# Patient Record
Sex: FEMALE | Race: WHITE | NOT HISPANIC OR LATINO | Employment: FULL TIME | URBAN - METROPOLITAN AREA
[De-identification: names, ages, dates, MRNs, and addresses within clinical notes are randomized per-mention and may not be internally consistent; named-entity substitution may affect disease eponyms.]

---

## 2023-09-01 ENCOUNTER — OFFICE VISIT (OUTPATIENT)
Dept: URGENT CARE | Facility: CLINIC | Age: 38
End: 2023-09-01
Payer: COMMERCIAL

## 2023-09-01 VITALS
WEIGHT: 182.88 LBS | BODY MASS INDEX: 31.22 KG/M2 | HEART RATE: 89 BPM | RESPIRATION RATE: 18 BRPM | SYSTOLIC BLOOD PRESSURE: 107 MMHG | HEIGHT: 64 IN | OXYGEN SATURATION: 97 % | DIASTOLIC BLOOD PRESSURE: 75 MMHG | TEMPERATURE: 98 F

## 2023-09-01 DIAGNOSIS — R05.9 COUGH WITH FEVER: Primary | ICD-10-CM

## 2023-09-01 DIAGNOSIS — U07.1 SARS-COV-2 POSITIVE: ICD-10-CM

## 2023-09-01 DIAGNOSIS — R50.9 COUGH WITH FEVER: Primary | ICD-10-CM

## 2023-09-01 LAB
CTP QC/QA: YES
SARS-COV-2 AG RESP QL IA.RAPID: POSITIVE

## 2023-09-01 PROCEDURE — 87811 SARS CORONAVIRUS 2 ANTIGEN POCT, MANUAL READ: ICD-10-PCS | Mod: QW,S$GLB,, | Performed by: FAMILY MEDICINE

## 2023-09-01 PROCEDURE — 99203 OFFICE O/P NEW LOW 30 MIN: CPT | Mod: S$GLB,,, | Performed by: FAMILY MEDICINE

## 2023-09-01 PROCEDURE — 87811 SARS-COV-2 COVID19 W/OPTIC: CPT | Mod: QW,S$GLB,, | Performed by: FAMILY MEDICINE

## 2023-09-01 PROCEDURE — 99203 PR OFFICE/OUTPT VISIT, NEW, LEVL III, 30-44 MIN: ICD-10-PCS | Mod: S$GLB,,, | Performed by: FAMILY MEDICINE

## 2023-09-01 NOTE — PROGRESS NOTES
"Subjective:      Patient ID: Lanie Hernandez is a 37 y.o. female.    Vitals:  height is 5' 4.45" (1.637 m) and weight is 83 kg (182 lb 14 oz). Her oral temperature is 97.9 °F (36.6 °C). Her blood pressure is 107/75 and her pulse is 89. Her respiration is 18 and oxygen saturation is 97%.     Chief Complaint: Cough    38 y/o female presents to clinic with c/o constant headache, body aches, cough, congestion, and nausea. Patient stated she started to feel bad 2 days ago and as time went her symptoms progressed. Patient stated last night was the worse night. She had a really bad headache and ran an oral fever of 103.0. Patient states that she took otc tylenol cold and sinus and mucinex and got very minimal relief from symptoms. Patient states her cough last night was also bad where she coughed till she would throw up.   Had covid 3 times in the past. Unvaccinated.    Cough  This is a new problem. The current episode started in the past 7 days. The problem has been gradually worsening. The problem occurs every few hours. The cough is Productive of sputum. Associated symptoms include chest pain, chills, a fever, headaches, heartburn, nasal congestion, postnasal drip, rhinorrhea, a sore throat and sweats. Pertinent negatives include no ear congestion, ear pain, hemoptysis, myalgias, rash, shortness of breath, weight loss or wheezing. Nothing aggravates the symptoms. She has tried OTC cough suppressant for the symptoms. The treatment provided mild relief. There is no history of asthma, bronchiectasis, bronchitis, COPD, emphysema, environmental allergies or pneumonia.       Constitution: Positive for chills and fever.   HENT:  Positive for postnasal drip and sore throat. Negative for ear pain.    Cardiovascular:  Positive for chest pain.   Respiratory:  Positive for cough. Negative for bloody sputum, shortness of breath and wheezing.    Gastrointestinal:  Positive for heartburn.   Musculoskeletal:  Negative for muscle ache. "   Skin:  Negative for rash.   Allergic/Immunologic: Negative for environmental allergies.   Neurological:  Positive for headaches.      Objective:     Physical Exam   Constitutional:  Non-toxic appearance. She does not appear ill. No distress.   Eyes: Conjunctivae are normal.   Cardiovascular: Normal rate and regular rhythm.   Pulmonary/Chest: Effort normal and breath sounds normal. No stridor. No respiratory distress. She has no wheezes. She has no rhonchi. She has no rales. She exhibits no tenderness.   Abdominal: Normal appearance.   Neurological: She is alert.   Skin: Skin is not diaphoretic.   Nursing note and vitals reviewed.      Assessment:     1. Cough with fever    2. SARS-CoV-2 positive    Young patient low risk for severe disease. Self care discussed. Stress on hydration and analgesics/antipyretics. Red flag signs discussed. Vaccines discussed    Results for orders placed or performed in visit on 09/01/23   SARS Coronavirus 2 Antigen, POCT Manual Read   Result Value Ref Range    SARS Coronavirus 2 Antigen Positive (A) Negative     Acceptable Yes         Plan:       Cough with fever  -     SARS Coronavirus 2 Antigen, POCT Manual Read    SARS-CoV-2 positive        covid test positive  Self isolate 5 days, today is day #2. Then mask in public for 5 additional days  covid vaccine information given  Self care information given

## 2023-09-01 NOTE — LETTER
September 1, 2023      [facility] [place]  [address] , [address]  [facility] [address]  Phone: [phone]  Fax: [phone]       Patient: Lanie Hernandez   YOB: 1985  Date of Visit: 09/01/2023    To Whom It May Concern:    Hugh Hernandez  was at [facility] on 09/01/2023. The patient may return to work/school on 9/5/2023 with mask in public for 5 additional days. If you have any questions or concerns, or if I can be of further assistance, please do not hesitate to contact me.    Sincerely,    Jannie Nelson MD

## 2023-09-01 NOTE — PATIENT INSTRUCTIONS
covid test positive  Self isolate 5 days, today is day #2. Then mask in public for 5 additional days  covid vaccine information given  Self care information given